# Patient Record
Sex: FEMALE | Race: WHITE | URBAN - METROPOLITAN AREA
[De-identification: names, ages, dates, MRNs, and addresses within clinical notes are randomized per-mention and may not be internally consistent; named-entity substitution may affect disease eponyms.]

---

## 2019-05-16 ENCOUNTER — Encounter (OUTPATIENT)
Dept: URBAN - METROPOLITAN AREA CLINIC 71 | Facility: CLINIC | Age: 64
End: 2019-05-16
Payer: COMMERCIAL

## 2019-05-16 PROCEDURE — 92004 COMPRE OPH EXAM NEW PT 1/>: CPT | Performed by: OPTOMETRIST

## 2019-05-16 PROCEDURE — 92014 COMPRE OPH EXAM EST PT 1/>: CPT | Performed by: OPTOMETRIST

## 2021-07-08 ENCOUNTER — OFFICE VISIT (OUTPATIENT)
Dept: URBAN - METROPOLITAN AREA CLINIC 75 | Facility: CLINIC | Age: 66
End: 2021-07-08
Payer: COMMERCIAL

## 2021-07-08 DIAGNOSIS — H35.362 DRUSEN (DEGENERATIVE) OF MACULA, LEFT EYE: ICD-10-CM

## 2021-07-08 PROCEDURE — 92014 COMPRE OPH EXAM EST PT 1/>: CPT | Performed by: OPTOMETRIST

## 2021-07-08 PROCEDURE — 92133 CPTRZD OPH DX IMG PST SGM ON: CPT | Performed by: OPTOMETRIST

## 2021-07-08 PROCEDURE — 92134 CPTRZ OPH DX IMG PST SGM RTA: CPT | Performed by: OPTOMETRIST

## 2021-07-08 ASSESSMENT — INTRAOCULAR PRESSURE
OD: 17
OS: 14

## 2021-07-08 NOTE — IMPRESSION/PLAN
Impression: Drusen (degenerative) of macula, left eye: H35.362. Plan:  A diet rich in green vegetables is recommended. It is important to follow the amsler grid and report any changes such as increase in distortion or blurring of lines. Drusen may be an early sign of macular degeneration and this diagnosis may be possible in future. Contact office if you experience decrease in vision, blurred vision, distortion in vision or concerning changes in vision.

## 2021-07-08 NOTE — IMPRESSION/PLAN
Impression: Dry eye syndrome of bilateral lacrimal glands: H04.123-Dry eyes account for the patient's complaints. Plan:  Recommend 2-3 drops daily of Systane Balance or Refresh tears and warm compresses at night time. Contact office if dry eye does not improve, worsens or blurs vision.

## 2021-08-19 ENCOUNTER — OFFICE VISIT (OUTPATIENT)
Dept: URBAN - METROPOLITAN AREA CLINIC 75 | Facility: CLINIC | Age: 66
End: 2021-08-19
Payer: COMMERCIAL

## 2021-08-19 DIAGNOSIS — H04.123 DRY EYE SYNDROME OF BILATERAL LACRIMAL GLANDS: Primary | ICD-10-CM

## 2021-08-19 DIAGNOSIS — H25.813 COMBINED FORMS OF AGE-RELATED CATARACT, BILATERAL: ICD-10-CM

## 2021-08-19 DIAGNOSIS — H02.051 TRICHIASIS WITHOUT ENTROPION RIGHT UPPER EYELID: ICD-10-CM

## 2021-08-19 PROCEDURE — 67820 REVISE EYELASHES: CPT | Performed by: OPTOMETRIST

## 2021-08-19 PROCEDURE — 99213 OFFICE O/P EST LOW 20 MIN: CPT | Performed by: OPTOMETRIST

## 2021-08-19 ASSESSMENT — INTRAOCULAR PRESSURE
OS: 13
OD: 15

## 2021-08-19 NOTE — IMPRESSION/PLAN
Impression: Dry eye syndrome of bilateral lacrimal glands: H04.123-Dry eyes account for the patient's complaints. *Vista Dry Eye Formula Plan: Recommend 2-3 drops daily of Systane Balance or Refresh tears and warm compresses at night time. Educated pt to use a sock filled with rice, a baked potato or a boiled egg (warm not hot)as a compress. Contact office if dry eye does not improve, worsens or blurs vision.

## 2021-08-19 NOTE — IMPRESSION/PLAN
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 Pt gave verbal consent for minor procedure. Plan: observe.

## 2021-10-04 ENCOUNTER — OFFICE VISIT (OUTPATIENT)
Dept: URBAN - METROPOLITAN AREA CLINIC 75 | Facility: CLINIC | Age: 66
End: 2021-10-04
Payer: COMMERCIAL

## 2021-10-04 DIAGNOSIS — H52.4 PRESBYOPIA: ICD-10-CM

## 2021-10-04 PROCEDURE — 99213 OFFICE O/P EST LOW 20 MIN: CPT | Performed by: OPTOMETRIST

## 2021-10-04 ASSESSMENT — INTRAOCULAR PRESSURE
OD: 10
OS: 9

## 2021-10-04 ASSESSMENT — VISUAL ACUITY
OD: 20/25
OS: 20/25

## 2022-01-24 ENCOUNTER — OFFICE VISIT (OUTPATIENT)
Dept: URBAN - METROPOLITAN AREA CLINIC 75 | Facility: CLINIC | Age: 67
End: 2022-01-24
Payer: COMMERCIAL

## 2022-01-24 PROCEDURE — 92133 CPTRZD OPH DX IMG PST SGM ON: CPT | Performed by: OPTOMETRIST

## 2022-01-24 PROCEDURE — 92014 COMPRE OPH EXAM EST PT 1/>: CPT | Performed by: OPTOMETRIST

## 2022-01-24 PROCEDURE — 92134 CPTRZ OPH DX IMG PST SGM RTA: CPT | Performed by: OPTOMETRIST

## 2022-01-24 PROCEDURE — 92015 DETERMINE REFRACTIVE STATE: CPT | Performed by: OPTOMETRIST

## 2022-01-24 ASSESSMENT — VISUAL ACUITY
OS: 20/60
OD: 20/60

## 2022-01-24 ASSESSMENT — INTRAOCULAR PRESSURE
OD: 17
OS: 16

## 2022-01-24 NOTE — IMPRESSION/PLAN
Impression: Drusen (degenerative) of macula, bilateral: H35.363. Plan: A diet rich in green vegetables and eye vitamins are recommended. If drusen progress they can cause decrease in vision or distortion of vision. Drusen may be an early sign of macular degeneration and this diagnosis may be possible in future. Contact office if you experience decrease in vision, blurred vision, distortion in vision or concerning changes in vision.

## 2022-01-24 NOTE — IMPRESSION/PLAN
Impression: Combined forms of age-related cataract, bilateral: H25.813-Cataracts account for the patient's complaints. Good dilator, no retinal clearance needed. Drusen & PVD's OU. Plan:  Discussed all risks, benefits, procedures and recovery. Patient understands changing glasses will not improve vision. Patient desires to have surgery.

## 2022-01-24 NOTE — IMPRESSION/PLAN
Impression: Vitreous degeneration, bilateral: H43.813. Plan: Reviewed the signs and symptoms of a retinal tear and detachment in detail with the patient, including worsening flashes, new floaters, and development of a shadow/curtain in the peripheral visual field. The patient was advised to call immediately with any changes to [address] or increase in symptoms.

## 2022-03-16 ENCOUNTER — OFFICE VISIT (OUTPATIENT)
Dept: URBAN - METROPOLITAN AREA CLINIC 71 | Facility: CLINIC | Age: 67
End: 2022-03-16
Payer: COMMERCIAL

## 2022-03-16 DIAGNOSIS — H43.813 VITREOUS DEGENERATION, BILATERAL: ICD-10-CM

## 2022-03-16 DIAGNOSIS — H35.363 DRUSEN (DEGENERATIVE) OF MACULA, BILATERAL: ICD-10-CM

## 2022-03-16 PROCEDURE — 99203 OFFICE O/P NEW LOW 30 MIN: CPT | Performed by: OPHTHALMOLOGY

## 2022-03-16 RX ORDER — KETOROLAC TROMETHAMINE 5 MG/ML
0.5 % SOLUTION OPHTHALMIC
Qty: 5 | Refills: 1 | Status: ACTIVE
Start: 2022-03-16

## 2022-03-16 RX ORDER — MELOXICAM 15 MG/1
15 MG TABLET ORAL
Qty: 0 | Refills: 0 | Status: ACTIVE
Start: 2022-03-16

## 2022-03-16 RX ORDER — LEVOTHYROXINE SODIUM 112 UG/1
CAPSULE ORAL
Qty: 0 | Refills: 0 | Status: ACTIVE
Start: 2022-03-16

## 2022-03-16 ASSESSMENT — INTRAOCULAR PRESSURE
OS: 18
OD: 13

## 2022-03-16 ASSESSMENT — VISUAL ACUITY
OS: 20/60
OD: 20/60

## 2022-03-16 NOTE — IMPRESSION/PLAN
Impression: Drusen (degenerative) of macula, bilateral: H35.363. Plan: Contact office if you experience decrease in vision, blurred vision, distortion in vision or concerning changes in vision.

## 2022-03-16 NOTE — IMPRESSION/PLAN
Impression: Combined forms of age-related cataract, bilateral: H25.813. Plan: Heart and lungs clear. R/B/A discussed. Proceed with Care Plus with ORA cataract surgery with dexycu. OD first for distance, then OS for distance. History of RK OU. No recommendation for the Custom Care upgrade due to the RK incisions. Patient voices understanding that cataract surgery is not a guarantee for 20/20 vision and that glasses may be needed after the surgery. No clearance needed per Dr. Gato Nari. Start keotorlac BID for 3 weeks starting the day prior to surgery on the surgical eye.

## 2022-03-31 ENCOUNTER — SURGERY (OUTPATIENT)
Dept: URBAN - METROPOLITAN AREA SURGERY 45 | Facility: SURGERY | Age: 67
End: 2022-03-31
Payer: COMMERCIAL

## 2022-03-31 ENCOUNTER — SURGERY (OUTPATIENT)
Dept: URBAN - METROPOLITAN AREA SURGERY 44 | Facility: SURGERY | Age: 67
End: 2022-03-31
Payer: COMMERCIAL

## 2022-03-31 PROCEDURE — 66984 XCAPSL CTRC RMVL W/O ECP: CPT | Performed by: OPHTHALMOLOGY

## 2022-03-31 PROCEDURE — PR1CP PR1CP: CUSTOM | Performed by: OPHTHALMOLOGY

## 2022-04-01 ENCOUNTER — POST-OPERATIVE VISIT (OUTPATIENT)
Dept: URBAN - METROPOLITAN AREA CLINIC 75 | Facility: CLINIC | Age: 67
End: 2022-04-01
Payer: COMMERCIAL

## 2022-04-01 PROCEDURE — 99024 POSTOP FOLLOW-UP VISIT: CPT | Performed by: OPTOMETRIST

## 2022-04-01 ASSESSMENT — INTRAOCULAR PRESSURE
OS: 7
OD: 20

## 2022-04-01 NOTE — IMPRESSION/PLAN
Impression:  Encounter for surgical aftercare following surgery on a sense organ  Z48.810. Excellent post op course   Post operative instructions reviewed - Cataract . Plan: Patient clear to continue as scheduled for second eye. Contact office with any changes in vision or concerns. 4wk PO w/Ref to be scheduled.

## 2022-04-08 ENCOUNTER — POST-OPERATIVE VISIT (OUTPATIENT)
Dept: URBAN - METROPOLITAN AREA CLINIC 71 | Facility: CLINIC | Age: 67
End: 2022-04-08
Payer: COMMERCIAL

## 2022-04-08 DIAGNOSIS — Z48.810 ENCOUNTER FOR SURGICAL AFTERCARE FOLLOWING SURGERY ON A SENSE ORGAN: Primary | ICD-10-CM

## 2022-04-08 PROCEDURE — 99024 POSTOP FOLLOW-UP VISIT: CPT | Performed by: OPHTHALMOLOGY

## 2022-04-08 ASSESSMENT — INTRAOCULAR PRESSURE
OS: 17
OD: 15

## 2022-04-08 NOTE — IMPRESSION/PLAN
Impression: S/P Cataract Extraction by phacoemulsification with IOL placement; ORA OD - 8 Days. Healing well. IOL well centered. IOP is stable. Plan: Patient is cleared to proceed as scheduled for second eye cataract surgery. PATIENT TO NOT HAVE DIAMOX FOR SECOND EYE SURGERY. SHE DID NOT TOLERATE IT WELL.

## 2022-04-15 ENCOUNTER — POST-OPERATIVE VISIT (OUTPATIENT)
Dept: URBAN - METROPOLITAN AREA CLINIC 71 | Facility: CLINIC | Age: 67
End: 2022-04-15
Payer: COMMERCIAL

## 2022-04-15 DIAGNOSIS — Z96.1 PRESENCE OF INTRAOCULAR LENS: Primary | ICD-10-CM

## 2022-04-15 PROCEDURE — 99024 POSTOP FOLLOW-UP VISIT: CPT | Performed by: OPTOMETRIST

## 2022-04-15 NOTE — IMPRESSION/PLAN
Impression: S/P Cataract Extraction by phacoemulsification with IOL placement; ORA OS - 1 Day. Presence of intraocular lens  Z96.1. Excellent post op course. Plan: Discussed with pt. Keep appt as scheduled 05/12 for post-op. Call with any concerns or new symptoms.

## 2022-05-06 ENCOUNTER — POST-OPERATIVE VISIT (OUTPATIENT)
Dept: URBAN - METROPOLITAN AREA CLINIC 71 | Facility: CLINIC | Age: 67
End: 2022-05-06
Payer: COMMERCIAL

## 2022-05-06 DIAGNOSIS — Z96.1 PRESENCE OF INTRAOCULAR LENS: Primary | ICD-10-CM

## 2022-05-06 PROCEDURE — 99024 POSTOP FOLLOW-UP VISIT: CPT | Performed by: OPHTHALMOLOGY

## 2022-05-06 ASSESSMENT — VISUAL ACUITY
OD: 20/20
OS: 20/20

## 2022-05-06 ASSESSMENT — INTRAOCULAR PRESSURE
OD: 11
OS: 9

## 2022-05-06 NOTE — IMPRESSION/PLAN
Impression: S/P Cataract Extraction by phacoemulsification with IOL placement; ORA OS - 22 Days. Healing well. IOL well centered. Discussed with patient that glasses will give her the best acuity for distance. Patient is J1+ without correction. Informed patient that although the goal was to set her for distance, she is more nearsighted. No recommendation for a lens exchange at this time. Plan: Keep appointment as scheduled next week with Dr. Roscoe Crigler.

## 2022-05-12 ENCOUNTER — POST-OPERATIVE VISIT (OUTPATIENT)
Dept: URBAN - METROPOLITAN AREA CLINIC 75 | Facility: CLINIC | Age: 67
End: 2022-05-12
Payer: COMMERCIAL

## 2022-05-12 DIAGNOSIS — Z48.810 ENCOUNTER FOR SURGICAL AFTERCARE FOLLOWING SURGERY ON A SENSE ORGAN: ICD-10-CM

## 2022-05-12 DIAGNOSIS — H52.4 PRESBYOPIA: Primary | ICD-10-CM

## 2022-05-12 PROCEDURE — 99024 POSTOP FOLLOW-UP VISIT: CPT | Performed by: OPTOMETRIST

## 2022-05-12 ASSESSMENT — INTRAOCULAR PRESSURE
OD: 11
OS: 10

## 2022-05-12 NOTE — IMPRESSION/PLAN
Impression:  Encounter for surgical aftercare following surgery on a sense organ  Z48.810. Excellent post op course Plan: New glasses Rx was generated today. Discussed and educated pt on hard Scleral lens due to Astigmatism & RK SX. Pt will consider. Pt to contact office with any questions or concerns. PRN Return 6m DE Cap Check

## 2022-06-09 ENCOUNTER — OFFICE VISIT (OUTPATIENT)
Dept: URBAN - METROPOLITAN AREA CLINIC 75 | Facility: CLINIC | Age: 67
End: 2022-06-09
Payer: COMMERCIAL

## 2022-06-09 DIAGNOSIS — H52.4 PRESBYOPIA: Primary | ICD-10-CM

## 2022-06-09 PROCEDURE — 92015 DETERMINE REFRACTIVE STATE: CPT | Performed by: OPTOMETRIST

## 2022-06-09 ASSESSMENT — VISUAL ACUITY
OS: 20/20
OD: 20/20

## 2022-06-09 NOTE — IMPRESSION/PLAN
Impression: Presbyopia: H52.4. Plan: New glasses Rx was generated today. Pt to contact office with any questions or concerns.  PRN

## 2022-11-19 ENCOUNTER — OFFICE VISIT (OUTPATIENT)
Dept: URBAN - METROPOLITAN AREA CLINIC 71 | Facility: CLINIC | Age: 67
End: 2022-11-19
Payer: COMMERCIAL

## 2022-11-19 DIAGNOSIS — H53.2 DIPLOPIA: Primary | ICD-10-CM

## 2022-11-19 DIAGNOSIS — H43.813 VITREOUS DEGENERATION, BILATERAL: ICD-10-CM

## 2022-11-19 DIAGNOSIS — D31.31 BENIGN NEOPLASM OF RIGHT CHOROID: ICD-10-CM

## 2022-11-19 DIAGNOSIS — D31.32 BENIGN NEOPLASM OF LEFT CHOROID: ICD-10-CM

## 2022-11-19 PROCEDURE — 99213 OFFICE O/P EST LOW 20 MIN: CPT

## 2022-11-19 ASSESSMENT — INTRAOCULAR PRESSURE
OS: 8
OD: 10

## 2022-11-19 NOTE — IMPRESSION/PLAN
Impression: Benign neoplasm of left choroid: D31.32. Optos ordered and done today PVD OU, .5x.5 mm OD and 1x1 mm OS Plan: Discussed DX in detail with pt Explained that a nevus is like a freckle in the eye, and like a freckle on the skin we monitor for changes and abnormalities, though risk of nevus turning into melanoma is less than 1%. With abnormal presentation, will elect to monitor 6-9 months. Do not recommend any treatment at this time.  
Pt voices understanding

## 2022-11-19 NOTE — IMPRESSION/PLAN
Impression: Diplopia: H53.2. Pt is experiencing double VA, per pt it is OD only 
she also makes mention that it is just a shadowing
esophoria- merges @ 3 base out Plan: Educated pt on shadowing she is experiencing is due to needing some prism. Explained that pt eyes sit more inward than normal. Will add prism to MRX and give to pt today Will monitor for changes Also informed pt that it can take some time to adjust to prism in glasses.  Can take up to 2-3 wks

## 2022-12-01 ENCOUNTER — TESTING ONLY (OUTPATIENT)
Dept: URBAN - METROPOLITAN AREA CLINIC 71 | Facility: CLINIC | Age: 67
End: 2022-12-01
Payer: COMMERCIAL

## 2022-12-01 DIAGNOSIS — H53.2 DIPLOPIA: ICD-10-CM

## 2022-12-01 DIAGNOSIS — H26.491 OTHER SECONDARY CATARACT, RIGHT EYE: Primary | ICD-10-CM

## 2022-12-01 DIAGNOSIS — H52.4 PRESBYOPIA: ICD-10-CM

## 2022-12-01 DIAGNOSIS — Z96.1 PRESENCE OF INTRAOCULAR LENS: ICD-10-CM

## 2022-12-01 PROCEDURE — V2799 MISC VISION ITEM OR SERVICE: HCPCS

## 2022-12-01 PROCEDURE — 92015 DETERMINE REFRACTIVE STATE: CPT

## 2022-12-01 ASSESSMENT — INTRAOCULAR PRESSURE
OD: 14
OS: 11

## 2022-12-01 NOTE — IMPRESSION/PLAN
Impression: Presbyopia: H52.4. Plan: Trial-framed Rx with prism, no changes to Rx made. Patient reports clear and single vision at distance, computer, and near. Patient reports TF-Rx does not cause any dizziness or disorientation. Patient reports left eye feels different/off when covering right eye. Discussed that prism is designed for binocular vision as the prism power is split between the two eyes. 
Will wait on updating glasses Rx until after YAG OD

## 2022-12-01 NOTE — IMPRESSION/PLAN
Impression: Other secondary cataract, right eye: H26.491. Plan: Discussed in detail with patient. Informed patient that PCO may be affecting vision OD and causing smudging patient is experiencing. Informed patient of possible treatment. Will wait on new glasses Rx until after attempting YAG to help improve symptoms.  Will have patient return for YAG eval.

## 2022-12-01 NOTE — IMPRESSION/PLAN
Impression: Diplopia: H53.2. PT still experiencing diplopia with new glasses - prism in glasses made incorrectly Plan: Refer to plan 1. Discussed. Pt advised to contact office if diplopia worsens.

## 2022-12-08 ENCOUNTER — OFFICE VISIT (OUTPATIENT)
Dept: URBAN - METROPOLITAN AREA CLINIC 71 | Facility: CLINIC | Age: 67
End: 2022-12-08
Payer: COMMERCIAL

## 2022-12-08 DIAGNOSIS — H04.123 DRY EYE SYNDROME OF BILATERAL LACRIMAL GLANDS: ICD-10-CM

## 2022-12-08 DIAGNOSIS — H02.834 DERMATOCHALASIS OF LEFT UPPER EYELID: ICD-10-CM

## 2022-12-08 DIAGNOSIS — H02.831 DERMATOCHALASIS OF RIGHT UPPER EYELID: ICD-10-CM

## 2022-12-08 DIAGNOSIS — H26.491 OTHER SECONDARY CATARACT, RIGHT EYE: Primary | ICD-10-CM

## 2022-12-08 DIAGNOSIS — H43.822 VITREOMACULAR ADHESION, LEFT EYE: ICD-10-CM

## 2022-12-08 DIAGNOSIS — H02.889 MEIBOMIAN GLAND DYSFUNCTION OF EYE: ICD-10-CM

## 2022-12-08 DIAGNOSIS — H35.363 DRUSEN (DEGENERATIVE) OF MACULA, BILATERAL: ICD-10-CM

## 2022-12-08 DIAGNOSIS — H52.4 PRESBYOPIA: ICD-10-CM

## 2022-12-08 PROCEDURE — 99214 OFFICE O/P EST MOD 30 MIN: CPT

## 2022-12-08 PROCEDURE — 92134 CPTRZ OPH DX IMG PST SGM RTA: CPT

## 2022-12-08 ASSESSMENT — VISUAL ACUITY
OD: 20/20
OS: 20/20

## 2022-12-08 ASSESSMENT — INTRAOCULAR PRESSURE
OD: 12
OS: 11

## 2022-12-08 NOTE — IMPRESSION/PLAN
Impression: Other secondary cataract, right eye: H26.491. Plan: Discussed diagnosis in detail with patient. Discussed R/B/A, RL2, to YAG PC OD/OS, with patient. Pt voices understanding and wishes to proceed. 

NA YAG OD Only due to pt complaints of glare

## 2022-12-08 NOTE — IMPRESSION/PLAN
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 Plan: Discussed diagnosis in detail with patient. No treatment is required or recommended at this time. Discussed signs and symptoms of VMA/floaters. Discussed signs and symptoms of retinal detachment.  Will continue to observe condition and or symptoms yearly with DFE

## 2022-12-08 NOTE — IMPRESSION/PLAN
Impression: Dermatochalasis of right upper eyelid: H02.831. Plan: Discussed diagnosis in detail with patient. Discussed treatment options with patient, though none is required at this time. Will continue to observe condition and or symptoms. If pt would like to proceed with treatment recommend oculoplastic consult, with Dr Alisa Ibarra.

## 2022-12-08 NOTE — IMPRESSION/PLAN
Impression: Drusen (degenerative) of macula, bilateral: H35.363. OCT ordered and performed today Plan: Discussed DX and possible outcomes with pt in detail Explained to pt that there is no treatment for drusen, only monitoring, as well as advised pt that Drusen can be a precursor to AMD but does not mean pt has AMD> Will continue to monitor yearly w/ DFE with OCT for monitoring. Pt voices understanding. Discussed DX and possible outcomes with pt in detail Explained to pt that there is no treatment for drusen, only monitoring, as well as advised pt that Drusen can be a precursor to AMD but does not mean pt has AMD> Will continue to monitor yearly w/ DFE with OCT for monitoring. Pt voices understanding.

## 2022-12-08 NOTE — IMPRESSION/PLAN
Impression: Dermatochalasis of left upper eyelid: H02.834. Plan: Discussed diagnosis in detail with patient. Discussed treatment options with patient, though none is required at this time. Will continue to observe condition and or symptoms. If pt would like to proceed with treatment recommend oculoplastic consult, with Dr Sarabjit Quevedo.

## 2023-01-30 ENCOUNTER — OFFICE VISIT (OUTPATIENT)
Dept: URBAN - METROPOLITAN AREA CLINIC 71 | Facility: CLINIC | Age: 68
End: 2023-01-30
Payer: COMMERCIAL

## 2023-01-30 DIAGNOSIS — H57.813 BROW PTOSIS, BILATERAL: ICD-10-CM

## 2023-01-30 DIAGNOSIS — H02.834 DERMATOCHALASIS OF LEFT UPPER EYELID: ICD-10-CM

## 2023-01-30 DIAGNOSIS — H02.831 DERMATOCHALASIS OF RIGHT UPPER EYELID: Primary | ICD-10-CM

## 2023-01-30 PROCEDURE — 99213 OFFICE O/P EST LOW 20 MIN: CPT | Performed by: OPHTHALMOLOGY

## 2023-01-30 PROCEDURE — 92285 EXTERNAL OCULAR PHOTOGRAPHY: CPT | Performed by: OPHTHALMOLOGY

## 2023-01-30 PROCEDURE — 92082 INTERMEDIATE VISUAL FIELD XM: CPT | Performed by: OPHTHALMOLOGY

## 2023-01-30 NOTE — IMPRESSION/PLAN
Impression: Brow ptosis, bilateral: H57.813. Plan: may require central pre-trichial brow lift in the future.

## 2023-02-20 ENCOUNTER — SURGERY (OUTPATIENT)
Dept: URBAN - METROPOLITAN AREA SURGERY 44 | Facility: SURGERY | Age: 68
End: 2023-02-20
Payer: COMMERCIAL

## 2023-02-20 ENCOUNTER — SURGERY (OUTPATIENT)
Dept: URBAN - METROPOLITAN AREA SURGERY 45 | Facility: SURGERY | Age: 68
End: 2023-02-20
Payer: COMMERCIAL

## 2023-02-20 PROCEDURE — 66821 AFTER CATARACT LASER SURGERY: CPT | Performed by: OPHTHALMOLOGY

## 2023-02-27 ENCOUNTER — POST-OPERATIVE VISIT (OUTPATIENT)
Dept: URBAN - METROPOLITAN AREA CLINIC 71 | Facility: CLINIC | Age: 68
End: 2023-02-27
Payer: COMMERCIAL

## 2023-02-27 DIAGNOSIS — Z48.810 ENCOUNTER FOR SURGICAL AFTERCARE FOLLOWING SURGERY ON A SENSE ORGAN: Primary | ICD-10-CM

## 2023-02-27 PROCEDURE — 99024 POSTOP FOLLOW-UP VISIT: CPT

## 2023-02-27 ASSESSMENT — INTRAOCULAR PRESSURE
OD: 10
OS: 11

## 2023-02-27 NOTE — IMPRESSION/PLAN
Impression: S/P YAG Capsulotomy (Yttrium Aluminum [place]) OD - 7 Days. Encounter for surgical aftercare following surgery on a sense organ  Z48.810. Excellent post op course Plan: IOL stable and in place OD. Capsule open S/P YAG CAP OD. IOP stable OU. Good visual outcome S/P YAG OD.  PT to RTC in 1-2 weeks for refraction

## 2023-07-25 ENCOUNTER — POST-OPERATIVE VISIT (OUTPATIENT)
Dept: URBAN - METROPOLITAN AREA CLINIC 72 | Facility: CLINIC | Age: 68
End: 2023-07-25
Payer: COMMERCIAL

## 2023-07-25 DIAGNOSIS — Z48.89 ENCOUNTER FOR OTHER SPECIFIED SURGICAL AFTERCARE: Primary | ICD-10-CM

## 2023-07-25 PROCEDURE — 99024 POSTOP FOLLOW-UP VISIT: CPT | Performed by: OPTOMETRIST

## 2023-07-25 ASSESSMENT — INTRAOCULAR PRESSURE
OD: 14
OS: 15

## 2024-08-22 ENCOUNTER — OFFICE VISIT (OUTPATIENT)
Dept: URBAN - METROPOLITAN AREA CLINIC 71 | Facility: CLINIC | Age: 69
End: 2024-08-22
Payer: COMMERCIAL

## 2024-08-22 DIAGNOSIS — H04.123 DRY EYE SYNDROME OF BILATERAL LACRIMAL GLANDS: ICD-10-CM

## 2024-08-22 DIAGNOSIS — H26.492 OTHER SECONDARY CATARACT, LEFT EYE: ICD-10-CM

## 2024-08-22 DIAGNOSIS — H35.363 DRUSEN (DEGENERATIVE) OF MACULA, BILATERAL: Primary | ICD-10-CM

## 2024-08-22 PROCEDURE — 92133 CPTRZD OPH DX IMG PST SGM ON: CPT

## 2024-08-22 PROCEDURE — 99213 OFFICE O/P EST LOW 20 MIN: CPT

## 2024-08-22 PROCEDURE — 92134 CPTRZ OPH DX IMG PST SGM RTA: CPT

## 2024-08-22 RX ORDER — PREDNISOLONE ACETATE 10 MG/ML
1 % SUSPENSION/ DROPS OPHTHALMIC
Qty: 5 | Refills: 1 | Status: ACTIVE
Start: 2024-08-22

## 2024-08-22 ASSESSMENT — VISUAL ACUITY
OD: 20/20
OS: 20/30

## 2024-08-22 ASSESSMENT — INTRAOCULAR PRESSURE
OS: 13
OD: 13

## 2024-11-05 ENCOUNTER — SURGERY (OUTPATIENT)
Dept: URBAN - METROPOLITAN AREA SURGERY 45 | Facility: SURGERY | Age: 69
End: 2024-11-05
Payer: COMMERCIAL

## 2024-11-05 ENCOUNTER — SURGERY (OUTPATIENT)
Dept: URBAN - METROPOLITAN AREA SURGERY 44 | Facility: SURGERY | Age: 69
End: 2024-11-05
Payer: COMMERCIAL

## 2024-11-05 PROCEDURE — 66821 AFTER CATARACT LASER SURGERY: CPT | Performed by: OPHTHALMOLOGY

## 2024-11-14 ENCOUNTER — POST-OPERATIVE VISIT (OUTPATIENT)
Dept: URBAN - METROPOLITAN AREA CLINIC 71 | Facility: CLINIC | Age: 69
End: 2024-11-14
Payer: COMMERCIAL

## 2024-11-14 DIAGNOSIS — Z48.810 ENCOUNTER FOR SURGICAL AFTERCARE FOLLOWING SURGERY ON A SENSE ORGAN: Primary | ICD-10-CM

## 2024-11-14 PROCEDURE — 99024 POSTOP FOLLOW-UP VISIT: CPT

## 2024-11-14 ASSESSMENT — INTRAOCULAR PRESSURE
OD: 13
OS: 10